# Patient Record
(demographics unavailable — no encounter records)

---

## 2024-11-13 NOTE — HISTORY OF PRESENT ILLNESS
[FreeTextEntry1] : 9-year-old female who presents today accompanied by her mother for evaluation of the back with concern for scoliosis.  An asymmetry was recently noted on routine exam by pediatrician and advised family to follow up with an orthopedist. Denies any recent trauma or injuries. She denies any back pain, radiating pain, numbness, tingling sensations, discomfort, weakness to LE, Radiating LE pain. She has been participating in all her normal physical activities without restrictions or discomfort. There is family history of scoliosis of maternal grandmother, no treatment required. Here for further evaluation and management.

## 2024-11-13 NOTE — ASSESSMENT
[FreeTextEntry1] : 9-year-old female with spinal asymmetry.  Today's visit included obtaining the history from the child and parent, due to the child's age, the child could not be considered a reliable historian, requiring the parent to act as an independent historian. The condition, natural history, and prognosis were explained to the patient and family. Clinical exam reviewed with parent and patient at length. Natural history discussed. Child is 9 years of age. Patient has significant skeletal growth potential. Scoliosis can progress with time and growth. Clinically she has spinal asymmetry which is less than 10 degrees of curve. Observation has been recommended. Bracing is warranted for curves measuring greater than 25 degrees with skeletal growth remaining. The parent understands that the braces do not correct curves permanently and that there is 30% risk brace failure. Parent understands the risk of curve progression needing surgery. Surgery is recommended for scoliosis measuring greater than 45 degrees. Activities as tolerated. Prescription of Schroth therapy was also recommended and provided.  We will plan to see her back in 6 months for clinical evaluation. X-rays if clinically indicated.  All questions and concerns were addressed. Mother vocalized understanding and agreement to assessment and treatment  IRomina, have acted as a scribe and documented the above information for Dr. Martinez.  The above documentation completed by the scribe is an accurate record of both my words and actions.  MIKEYD

## 2024-11-13 NOTE — PHYSICAL EXAM
[FreeTextEntry1] : Gait: Presents ambulating independently without signs of antalgia. Good coordination and balance noted. GENERAL: alert, cooperative, in NAD SKIN: The skin is intact, warm, pink and dry over the area examined. EYES: Normal conjunctiva, normal eyelids and pupils were equal and round. ENT: normal ears, normal nose and normal lips. CARDIOVASCULAR: brisk capillary refill, but no peripheral edema. RESPIRATORY: The patient is in no apparent respiratory distress. They're taking full deep breaths without use of accessory muscles or evidence of audible wheezes or stridor without the use of a stethoscope. Normal respiratory effort. ABDOMEN: not examined  Spine: Inspection of the skin reveals no cafe au lait spots or large birth marks. From behind, patient is well centered with head and shoulders appropriately aligned with pelvis.  Left waist creases noted On AFB, ATR is 2-3 degrees NTTP over spinous processes and paraspinal musculature. Full range of motion at cervical, thoracic and lumbar spine with no pain or difficulty. No pelvic obliquity. No LLD  LE: Skin clean and intact. No deformity or lymphedema. Full ROM bilateral hips, knees and ankles.  PA 20 degrees 5/5 motor strength in LE. SILT distally. Brisk symmetric reflexes at Patellar and Achilles' tendons No clonus. DP 2+, BCR < 2 seconds  Abdominal reflexes are symmetric and present.

## 2024-11-13 NOTE — REVIEW OF SYSTEMS
[Change in Activity] : no change in activity [Rash] : no rash [Fever Above 102] : no fever [Itching] : no itching